# Patient Record
Sex: MALE | HISPANIC OR LATINO | Employment: UNEMPLOYED | URBAN - METROPOLITAN AREA
[De-identification: names, ages, dates, MRNs, and addresses within clinical notes are randomized per-mention and may not be internally consistent; named-entity substitution may affect disease eponyms.]

---

## 2023-08-29 VITALS
DIASTOLIC BLOOD PRESSURE: 87 MMHG | WEIGHT: 140 LBS | TEMPERATURE: 99 F | HEIGHT: 66 IN | RESPIRATION RATE: 16 BRPM | OXYGEN SATURATION: 98 % | SYSTOLIC BLOOD PRESSURE: 126 MMHG | HEART RATE: 85 BPM | BODY MASS INDEX: 22.5 KG/M2

## 2023-08-29 PROCEDURE — 99284 EMERGENCY DEPT VISIT MOD MDM: CPT

## 2023-08-30 ENCOUNTER — HOSPITAL ENCOUNTER (EMERGENCY)
Facility: CLINIC | Age: 28
Discharge: HOME | End: 2023-08-30
Attending: EMERGENCY MEDICINE | Admitting: EMERGENCY MEDICINE

## 2023-08-30 DIAGNOSIS — F11.93 OPIOID WITHDRAWAL (H): ICD-10-CM

## 2023-08-30 RX ORDER — CYCLOBENZAPRINE HCL 10 MG
10 TABLET ORAL ONCE
Status: DISCONTINUED | OUTPATIENT
Start: 2023-08-30 | End: 2023-08-30 | Stop reason: HOSPADM

## 2023-08-30 RX ORDER — CYCLOBENZAPRINE HCL 10 MG
10 TABLET ORAL 3 TIMES DAILY PRN
Qty: 21 TABLET | Refills: 0 | Status: SHIPPED | OUTPATIENT
Start: 2023-08-30 | End: 2023-09-06

## 2023-08-30 RX ORDER — GABAPENTIN 300 MG/1
300 CAPSULE ORAL 3 TIMES DAILY
Qty: 15 CAPSULE | Refills: 0 | Status: SHIPPED | OUTPATIENT
Start: 2023-08-30 | End: 2023-09-04

## 2023-08-30 RX ORDER — ONDANSETRON 4 MG/1
4 TABLET, ORALLY DISINTEGRATING ORAL EVERY 6 HOURS PRN
Qty: 10 TABLET | Refills: 0 | Status: SHIPPED | OUTPATIENT
Start: 2023-08-30 | End: 2023-09-02

## 2023-08-30 RX ORDER — ONDANSETRON 4 MG/1
4 TABLET, ORALLY DISINTEGRATING ORAL ONCE
Status: DISCONTINUED | OUTPATIENT
Start: 2023-08-30 | End: 2023-08-30 | Stop reason: HOSPADM

## 2023-08-30 RX ORDER — CLONIDINE HYDROCHLORIDE 0.1 MG/1
0.2 TABLET ORAL ONCE
Status: DISCONTINUED | OUTPATIENT
Start: 2023-08-30 | End: 2023-08-30 | Stop reason: HOSPADM

## 2023-08-30 RX ORDER — CLONIDINE 0.3 MG/24H
1 PATCH, EXTENDED RELEASE TRANSDERMAL WEEKLY
Status: DISCONTINUED | OUTPATIENT
Start: 2023-08-30 | End: 2023-08-30

## 2023-08-30 ASSESSMENT — ACTIVITIES OF DAILY LIVING (ADL)
ADLS_ACUITY_SCORE: 33
ADLS_ACUITY_SCORE: 33

## 2023-08-30 NOTE — ED PROVIDER NOTES
"  History     Chief Complaint:  Drug / Alcohol Assessment       HPI   Zaire Matson is a 28 year old male who presents for evaluation of an opoid addiction. He has been feeling myalgias, nausea, vomiting, and diarrhea after taking opioids daily for 6 months. His last dose was taken a couple of hours ago, and he took a smaller dose than normal in hopes that he can taper off of taking opioids. He denies SI/HI. He mentions that he has suboxone at home, but he doesn't like taking this.    Independent Historian:   None - Patient Only    Review of External Notes:   NA     Medications:    The patient is currently on no regular medications.    Past Medical History:    Denies past medical history.    Past Surgical History:    None.     Physical Exam   Patient Vitals for the past 24 hrs:   BP Temp Temp src Pulse Resp SpO2 Height Weight   08/29/23 2322 126/87 99  F (37.2  C) Temporal 85 16 98 % 1.676 m (5' 6\") 63.5 kg (140 lb)        Physical Exam  Vitals: reviewed by me  General: Pt seen on [facility], pleasant, cooperative, and alert to conversation  Eyes: Tracking well, clear conjunctiva BL  ENT: MMM, midline trachea.   Lungs: No tachypnea, no accessory muscle use. No respiratory distress.   CV: Rate as above  MSK: no joint effusion.  No evidence of trauma  Skin: No rash  Neuro: Clear speech and no facial droop.  Psych: Not RIS, no e/o AH/VH, denies suicidal ideation or homicidal ideation.      Emergency Department Course     Interventions:  Medications - No data to display     Assessments:  0100 I obtained history and examined the patient, as noted above.    Independent Interpretation (X-rays, CTs, rhythm strip):  None    Consultations/Discussion of Management or Tests:  None     Social Determinants of Health affecting care:   Stress/Adjustment Disorders    Disposition:  The patient was discharged to home.     Impression & Plan      Medical Decision Making:  This is a very pleasant 28-year-old male who presents " emergency room with appears to be opioid withdrawal.  He is politely declining Suboxone, tells me he has this at home, and does not like it as it puts him in the withdrawal even more so he states.  He is doing well here with oral medication that was given though of course he still has some symptoms, and he knows that we are unable to fully resolve his withdrawal symptoms.  I have given him some medications to go home with, he has good family support and he tells me that he does not intend on using.  No suicidal ideation, no indication for mental health assessment, will plan for discharge as above with symptomatic relief.    Diagnosis:    ICD-10-CM    1. Opioid withdrawal (H)  F11.93            Discharge Medications:  Discharge Medication List as of 8/30/2023  1:29 AM        START taking these medications    Details   cyclobenzaprine (FLEXERIL) 10 MG tablet Take 1 tablet (10 mg) by mouth 3 times daily as needed for muscle spasms, Disp-21 tablet, R-0, Local Print      gabapentin (NEURONTIN) 300 MG capsule Take 1 capsule (300 mg) by mouth 3 times daily for 5 days, Disp-15 capsule, R-0, Local Print      ondansetron (ZOFRAN ODT) 4 MG ODT tab Take 1 tablet (4 mg) by mouth every 6 hours as needed for nausea, Disp-10 tablet, R-0, Local Print            Scribe Disclosure:  I, Tucker Ramirez, am serving as a scribe at 1:02 AM on 8/30/2023 to document services personally performed by Kash Rader MD based on my observations and the provider's statements to me.        Kash Rader MD  08/30/23 0454

## 2023-08-30 NOTE — ED TRIAGE NOTES
"Patient presents with complaints of opioid addiction and potential withdrawal. Patient stated that he is \"almost certain\" it is fentanyl and that he tries to stop using but then he goes through \"horrible withdrawals\".     Triage Assessment       Row Name 08/29/23 3139       Triage Assessment (Adult)    Airway WDL WDL       Respiratory WDL    Respiratory WDL WDL       Skin Circulation/Temperature WDL    Skin Circulation/Temperature WDL WDL       Cardiac WDL    Cardiac WDL WDL       Peripheral/Neurovascular WDL    Peripheral Neurovascular WDL WDL       Cognitive/Neuro/Behavioral WDL    Cognitive/Neuro/Behavioral WDL WDL                    "

## 2023-08-30 NOTE — DISCHARGE INSTRUCTIONS
Please come back to the emergency room immediately with any other concerns you have, any new symptoms, and if the medication was given you was not helpful enough to manage her symptoms.  Please also follow with your regular doctor in the next 1 week, this is very important.  We have offered you Suboxone, and you have politely declined this, come back with any other concerns you have.